# Patient Record
Sex: FEMALE | Race: BLACK OR AFRICAN AMERICAN | Employment: FULL TIME | URBAN - METROPOLITAN AREA
[De-identification: names, ages, dates, MRNs, and addresses within clinical notes are randomized per-mention and may not be internally consistent; named-entity substitution may affect disease eponyms.]

---

## 2024-11-13 ENCOUNTER — OFFICE VISIT (OUTPATIENT)
Age: 19
End: 2024-11-13
Payer: COMMERCIAL

## 2024-11-13 VITALS
HEART RATE: 80 BPM | RESPIRATION RATE: 16 BRPM | WEIGHT: 230 LBS | SYSTOLIC BLOOD PRESSURE: 117 MMHG | OXYGEN SATURATION: 95 % | HEIGHT: 65 IN | BODY MASS INDEX: 38.32 KG/M2 | TEMPERATURE: 98 F | DIASTOLIC BLOOD PRESSURE: 78 MMHG

## 2024-11-13 DIAGNOSIS — N93.9 ABNORMAL UTERINE BLEEDING (AUB): Primary | ICD-10-CM

## 2024-11-13 DIAGNOSIS — Z97.5 IUD (INTRAUTERINE DEVICE) IN PLACE: ICD-10-CM

## 2024-11-13 PROCEDURE — 99203 OFFICE O/P NEW LOW 30 MIN: CPT | Performed by: OBSTETRICS & GYNECOLOGY

## 2024-11-13 RX ORDER — LEVONORGESTREL 52 MG/1
1 INTRAUTERINE DEVICE INTRAUTERINE ONCE
COMMUNITY

## 2024-11-13 SDOH — ECONOMIC STABILITY: INCOME INSECURITY: HOW HARD IS IT FOR YOU TO PAY FOR THE VERY BASICS LIKE FOOD, HOUSING, MEDICAL CARE, AND HEATING?: SOMEWHAT HARD

## 2024-11-13 SDOH — ECONOMIC STABILITY: TRANSPORTATION INSECURITY
IN THE PAST 12 MONTHS, HAS LACK OF TRANSPORTATION KEPT YOU FROM MEETINGS, WORK, OR FROM GETTING THINGS NEEDED FOR DAILY LIVING?: NO

## 2024-11-13 SDOH — ECONOMIC STABILITY: FOOD INSECURITY: WITHIN THE PAST 12 MONTHS, THE FOOD YOU BOUGHT JUST DIDN'T LAST AND YOU DIDN'T HAVE MONEY TO GET MORE.: NEVER TRUE

## 2024-11-13 SDOH — ECONOMIC STABILITY: FOOD INSECURITY: WITHIN THE PAST 12 MONTHS, YOU WORRIED THAT YOUR FOOD WOULD RUN OUT BEFORE YOU GOT MONEY TO BUY MORE.: NEVER TRUE

## 2024-11-13 ASSESSMENT — PATIENT HEALTH QUESTIONNAIRE - PHQ9
SUM OF ALL RESPONSES TO PHQ9 QUESTIONS 1 & 2: 0
SUM OF ALL RESPONSES TO PHQ9 QUESTIONS 1 & 2: 0
SUM OF ALL RESPONSES TO PHQ QUESTIONS 1-9: 0
1. LITTLE INTEREST OR PLEASURE IN DOING THINGS: NOT AT ALL
1. LITTLE INTEREST OR PLEASURE IN DOING THINGS: NOT AT ALL
SUM OF ALL RESPONSES TO PHQ QUESTIONS 1-9: 0
2. FEELING DOWN, DEPRESSED OR HOPELESS: NOT AT ALL
SUM OF ALL RESPONSES TO PHQ QUESTIONS 1-9: 0
2. FEELING DOWN, DEPRESSED OR HOPELESS: NOT AT ALL
SUM OF ALL RESPONSES TO PHQ QUESTIONS 1-9: 0

## 2024-11-13 NOTE — PROGRESS NOTES
Problem Visit    Chief Complaint:     Chief Complaint   Patient presents with    New Patient       HPI:    Kassie Arias is a 19 y.o.  female with LMP of No LMP recorded. (Menstrual status: IUD).  who complains of abnormal uterine bleeding.  She developed this problem approximately 3 weeks ago.     Pt has a mirena IUD that was placed 2020. She does not have cyclic menses but does have occasional spotting. She started to have random bleeding the weekend of 10/25 that lasted for 3 days. Then she noticed random bleeding episodes ranging from 1-3 days since then. She is using 2 liners/day. No cramping. No pain with sex.     GYN US done today showed IUD in the proper place in the uterus.     Her current method of family planning is IUD.     Past Medical History:    Past Medical History:   Diagnosis Date    IUD (intrauterine device) in place         History reviewed. No pertinent surgical history.  Social History     Occupational History    Not on file   Tobacco Use    Smoking status: Never    Smokeless tobacco: Never   Vaping Use    Vaping status: Never Used   Substance and Sexual Activity    Alcohol use: Yes     Comment: occasionally    Drug use: Never    Sexual activity: Yes     Partners: Male     Birth control/protection: I.U.D.     Family History   Problem Relation Age of Onset    Hypertension Mother     Mental Illness Father        No Known Allergies  Prior to Admission medications    Medication Sig Start Date End Date Taking? Authorizing Provider   levonorgestrel (MIRENA, 52 MG,) IUD 52 mg 1 each by IntraUTERine route once   Yes Provider, Historical, MD        Review of Systems - History obtained from the patient    Constitutional: negative for weight loss, fever, night sweats  CV: negative for chest pain, palpitations, edema  Resp: negative for cough, shortness of breath, wheezing  GI: negative for change in bowel habits, abdominal pain, black or bloody stools  : negative for frequency, dysuria,

## 2024-11-13 NOTE — PROGRESS NOTES
Kassie Arias is a 19 y.o. female presents for a problem visit.    Chief Complaint   Patient presents with    New Patient     No LMP recorded. (Menstrual status: IUD).  Birth Control: IUD  Last Pap: n/a    The patient is reporting having: irregular bleeding that started early October.  Pt states she has one day per week where she has light bleeding.            1. Have you been to the ER, urgent care clinic, or hospitalized since your last visit? New patient    2. Have you seen or consulted any other health care providers outside of the [facility] since your last visit? New patient    Examination chaperoned by Myesha Morejon LPN.

## 2024-12-11 ENCOUNTER — PROCEDURE VISIT (OUTPATIENT)
Age: 19
End: 2024-12-11
Payer: COMMERCIAL

## 2024-12-11 VITALS
OXYGEN SATURATION: 98 % | DIASTOLIC BLOOD PRESSURE: 83 MMHG | BODY MASS INDEX: 37.65 KG/M2 | HEART RATE: 73 BPM | SYSTOLIC BLOOD PRESSURE: 126 MMHG | WEIGHT: 226 LBS | TEMPERATURE: 97.3 F | HEIGHT: 65 IN | RESPIRATION RATE: 16 BRPM

## 2024-12-11 DIAGNOSIS — Z30.432 ENCOUNTER FOR IUD REMOVAL: Primary | ICD-10-CM

## 2024-12-11 PROCEDURE — 58301 REMOVE INTRAUTERINE DEVICE: CPT | Performed by: OBSTETRICS & GYNECOLOGY

## 2024-12-11 NOTE — PROGRESS NOTES
Kassie Arias is a 19 y.o. female presents for     Chief Complaint   Patient presents with    Procedure     IUD removal       No LMP recorded. (Menstrual status: IUD).    Birth Control: IUD    Last Pap: n/a        1. Have you been to the ER, urgent care clinic, or hospitalized since your last visit? No    2. Have you seen or consulted any other health care providers outside of the [facility] since your last visit? No      Chart reviewed for the following:   Myesha SINGH LPN, have reviewed the History, Physical and updated the Allergic reactions for Kassie Arias     TIME OUT performed immediately prior to start of procedure:   Myesha SINGH LPN, have performed the following reviews on Kassie Arias prior to the start of the procedure:            * Patient was identified by name and date of birth   * Agreement on procedure being performed was verified  * Risks and Benefits explained to the patient  * Procedure site verified and marked as necessary  * Patient was positioned for comfort  * Consent was signed and verified     Time: 11:20 am    Date of procedure: 12/11/2024    Procedure performed by:  Hay Alvarez MD       Provider assisted by: Myesha Morejon LPN     Patient assisted by: friend    How tolerated by patient: tolerated the procedure well with no complications    Post Procedural Pain Scale: 0 - No Hurt    Comments: none    Examination chaperoned by Myesha Morejon LPN.

## 2024-12-11 NOTE — PROGRESS NOTES
IUD REMOVAL    Chief Complaint: IUD removal    Indications for Removal:  Kassie Arias is a 19 y.o.  female presents for Mirena  removal. Her current IUD was placed 5 years ago. She has had bleeding problems with the IUD.  She requests removal of the IUD because she has experienced abnormal uterine bleeding. The IUD removal procedure was discussed with the patient and she had no further questions. Consent was obtained.   Pt does not want another contraception option. She is considering pregnancy next year.     Procedure:   Chaperone present    After a brief TIME OUT, the patient was placed in a dorsal lithotomy position and speculum was placed into the vagina. The cervix was visualized and the IUD strings were visualized. The strings were grasped with uterine dressing forcep and the IUD was removed intact. The IUD was disposed of in medical waste. The speculum was removed and the patient was monitored for 10 minutes after the procedure. She reports mild cramping.     Post Procedure:   The patient was informed of the need for contraception after IUD removal. She was also reminded that spotting and cramping are common after procedure and OTC pain medications were discussed. Questions answered.     Follow Up:  As needed      Hay Alvarez MD

## 2025-04-01 DIAGNOSIS — O36.80X0 ENCOUNTER TO DETERMINE FETAL VIABILITY OF PREGNANCY, SINGLE OR UNSPECIFIED FETUS: Primary | ICD-10-CM

## 2025-04-23 ENCOUNTER — PATIENT MESSAGE (OUTPATIENT)
Age: 20
End: 2025-04-23

## 2025-04-23 ENCOUNTER — INITIAL PRENATAL (OUTPATIENT)
Age: 20
End: 2025-04-23

## 2025-04-23 VITALS
BODY MASS INDEX: 38.49 KG/M2 | WEIGHT: 231 LBS | TEMPERATURE: 98.7 F | DIASTOLIC BLOOD PRESSURE: 76 MMHG | OXYGEN SATURATION: 98 % | HEIGHT: 65 IN | HEART RATE: 94 BPM | RESPIRATION RATE: 20 BRPM | SYSTOLIC BLOOD PRESSURE: 113 MMHG

## 2025-04-23 DIAGNOSIS — Z34.90 PREGNANCY, UNSPECIFIED GESTATIONAL AGE: Primary | ICD-10-CM

## 2025-04-23 PROBLEM — N93.9 ABNORMAL UTERINE BLEEDING (AUB): Status: RESOLVED | Noted: 2024-11-13 | Resolved: 2025-04-23

## 2025-04-23 PROBLEM — O99.210 OBESITY IN PREGNANCY: Status: ACTIVE | Noted: 2025-04-23

## 2025-04-23 PROCEDURE — 0500F INITIAL PRENATAL CARE VISIT: CPT | Performed by: OBSTETRICS & GYNECOLOGY

## 2025-04-23 RX ORDER — ASPIRIN 81 MG/1
81 TABLET ORAL DAILY
Qty: 90 TABLET | Refills: 3 | Status: SHIPPED | OUTPATIENT
Start: 2025-04-23

## 2025-04-23 RX ORDER — ONDANSETRON 4 MG/1
4 TABLET, ORALLY DISINTEGRATING ORAL 3 TIMES DAILY PRN
Qty: 30 TABLET | Refills: 3 | Status: SHIPPED | OUTPATIENT
Start: 2025-04-23

## 2025-04-23 SDOH — ECONOMIC STABILITY: FOOD INSECURITY: WITHIN THE PAST 12 MONTHS, YOU WORRIED THAT YOUR FOOD WOULD RUN OUT BEFORE YOU GOT MONEY TO BUY MORE.: NEVER TRUE

## 2025-04-23 SDOH — ECONOMIC STABILITY: FOOD INSECURITY: WITHIN THE PAST 12 MONTHS, THE FOOD YOU BOUGHT JUST DIDN'T LAST AND YOU DIDN'T HAVE MONEY TO GET MORE.: NEVER TRUE

## 2025-04-23 ASSESSMENT — ENCOUNTER SYMPTOMS
VOMITING: 0
RESPIRATORY NEGATIVE: 1
NAUSEA: 0
DIARRHEA: 0
BACK PAIN: 0
EYES NEGATIVE: 1

## 2025-04-23 ASSESSMENT — PATIENT HEALTH QUESTIONNAIRE - PHQ9
1. LITTLE INTEREST OR PLEASURE IN DOING THINGS: NOT AT ALL
SUM OF ALL RESPONSES TO PHQ QUESTIONS 1-9: 0
SUM OF ALL RESPONSES TO PHQ QUESTIONS 1-9: 0
2. FEELING DOWN, DEPRESSED OR HOPELESS: NOT AT ALL
SUM OF ALL RESPONSES TO PHQ QUESTIONS 1-9: 0
SUM OF ALL RESPONSES TO PHQ QUESTIONS 1-9: 0

## 2025-04-23 NOTE — PROGRESS NOTES
Kassie Arias is a 19 y.o. female presents for a new pregnancy visit.    Chief Complaint   Patient presents with    Initial Prenatal Visit     Medical Hx: cardiac issues while she was in the womb    Would like to do both NIPT & carrier screening    Patient's last menstrual period was 2025.    Last Pap: n/a    LMP history:    Please see US data     Ultrasound data:  She had an ultrasound done by the ultrasound tech today which revealed a viable burrows pregnancy with a gestational age of 8 weeks and 4 days giving an EDC of 2025.    Pregnancy symptoms:    Since her LMP she has experienced urinary frequency, breast tenderness, and nausea.   She has not been vomiting over the last few weeks.  Associated signs and symptoms which she denies: dysuria, discharge, vaginal bleeding.    She states she has gained weight:  Approximately 5 pounds over the last few weeks.    Relevant past pregnancy history:   She has the following pregnancy history: this is her first pregnancy   She has no history of  delivery.    Relevant past medical history:(relevant to this pregnancy): noncontributory.      Her occupation is: [employer]    1. Have you been to the ER, urgent care clinic, or hospitalized since your last visit? No    2. Have you seen or consulted any other health care providers outside of the [facility] since your last visit? No    Examination chaperoned by Ellie Harvey LPN.

## 2025-04-23 NOTE — PROGRESS NOTES
New OB Visit      Chief Complaint:   Chief Complaint   Patient presents with    Initial Prenatal Visit          HPI:    Kassie Arias is a 19 y.o.  Black /  female who presents for her new OB visit today.    Her ultrasound today showed a burrows pregnancy at 8w4d  with an of VALERIE 25 by ultrasound.  .      Patient's past medical history is significant for Obesity.       Since she found out she was pregnant, the patient has experienced the following problems: nausea.     Social History:     Pt does not have cats at home.     Pt has chicken pox or the chicken pox vaccine.     Patient denies travelling outside of the [place] in the last 6 months.     Genetics:    Patient and partner's genetic history reviewed, and they deny a personal history or family history of Sickle Cell Disease/Trait, Down Syndrome, Spina Bifida/Neural tube defects, Fragile X Syndrome, Muscular Dystrophy or Cystic Fibrosis (or carrier state). .    Genetic testing options in pregnancy discussed, including NIPT with AFP, Cystic Fibrosis carrier testing, Hemoglobin electrophoresis, referral to [facility] for CVS or amniocentesis, and no genetic testing and patient opts for NIPT/AFP testing and Hemoglobin electrophoresis testing.     Her current medications include   Current Outpatient Medications   Medication Sig Dispense Refill    aspirin 81 MG EC tablet Take 1 tablet by mouth daily 90 tablet 3    ondansetron (ZOFRAN-ODT) 4 MG disintegrating tablet Take 1 tablet by mouth 3 times daily as needed for Nausea or Vomiting 30 tablet 3    Prenatal MV & Min w/FA-DHA (PRENATAL ADULT GUMMY/DHA/FA) 0.4-25 MG CHEW Take 1 tablet by mouth daily 90 tablet 3     No current facility-administered medications for this visit.    .      Past Medical History:    Past Medical History:   Diagnosis Date    Encounter for IUD removal 2024    Mirena. Inserted 2020    Obesity      History reviewed. No pertinent surgical history.    Current Outpatient

## 2025-04-25 ENCOUNTER — RESULTS FOLLOW-UP (OUTPATIENT)
Age: 20
End: 2025-04-25

## 2025-04-25 LAB
BACTERIA SPEC CULT: NORMAL
SERVICE CMNT-IMP: NORMAL

## 2025-04-27 LAB
A VAGINAE DNA VAG QL NAA+PROBE: ABNORMAL SCORE
BVAB2 DNA VAG QL NAA+PROBE: ABNORMAL SCORE
C ALBICANS DNA VAG QL NAA+PROBE: POSITIVE
C GLABRATA DNA VAG QL NAA+PROBE: NEGATIVE
MEGA1 DNA VAG QL NAA+PROBE: ABNORMAL SCORE
SPECIMEN SOURCE: ABNORMAL

## 2025-04-28 LAB
A VAGINAE DNA VAG QL NAA+PROBE: ABNORMAL SCORE
BVAB2 DNA VAG QL NAA+PROBE: ABNORMAL SCORE
C ALBICANS DNA VAG QL NAA+PROBE: POSITIVE
C GLABRATA DNA VAG QL NAA+PROBE: NEGATIVE
MEGA1 DNA VAG QL NAA+PROBE: ABNORMAL SCORE
SPECIMEN SOURCE: ABNORMAL
T VAGINALIS RRNA SPEC QL NAA+PROBE: POSITIVE

## 2025-04-28 RX ORDER — METRONIDAZOLE 500 MG/1
500 TABLET ORAL 2 TIMES DAILY
Qty: 14 TABLET | Refills: 0 | Status: SHIPPED | OUTPATIENT
Start: 2025-04-28 | End: 2025-05-05

## 2025-04-28 RX ORDER — TERCONAZOLE 4 MG/G
1 CREAM VAGINAL NIGHTLY
Qty: 45 G | Refills: 0 | Status: SHIPPED | OUTPATIENT
Start: 2025-04-28 | End: 2025-05-05

## 2025-04-29 ENCOUNTER — APPOINTMENT (OUTPATIENT)
Facility: HOSPITAL | Age: 20
End: 2025-04-29
Payer: COMMERCIAL

## 2025-04-29 ENCOUNTER — HOSPITAL ENCOUNTER (EMERGENCY)
Facility: HOSPITAL | Age: 20
Discharge: HOME | End: 2025-04-29
Attending: EMERGENCY MEDICINE
Payer: COMMERCIAL

## 2025-04-29 VITALS
HEART RATE: 67 BPM | BODY MASS INDEX: 38.52 KG/M2 | OXYGEN SATURATION: 97 % | WEIGHT: 231.48 LBS | TEMPERATURE: 98.8 F | SYSTOLIC BLOOD PRESSURE: 131 MMHG | DIASTOLIC BLOOD PRESSURE: 75 MMHG | RESPIRATION RATE: 16 BRPM

## 2025-04-29 DIAGNOSIS — O20.0 THREATENED MISCARRIAGE: Primary | ICD-10-CM

## 2025-04-29 LAB
ABO + RH BLD: NORMAL
ALBUMIN SERPL-MCNC: 3.4 G/DL (ref 3.5–5)
ALBUMIN/GLOB SERPL: 0.9 (ref 1.1–2.2)
ALP SERPL-CCNC: 53 U/L (ref 45–117)
ALT SERPL-CCNC: 14 U/L (ref 12–78)
ANION GAP SERPL CALC-SCNC: 8 MMOL/L (ref 2–12)
APPEARANCE UR: ABNORMAL
AST SERPL-CCNC: 11 U/L (ref 15–37)
BACTERIA URNS QL MICRO: ABNORMAL /HPF
BASOPHILS # BLD: 0.02 K/UL (ref 0–0.1)
BASOPHILS NFR BLD: 0.3 % (ref 0–1)
BILIRUB SERPL-MCNC: 0.5 MG/DL (ref 0.2–1)
BILIRUB UR QL: NEGATIVE
BLOOD BANK CMNT PATIENT-IMP: NORMAL
BUN SERPL-MCNC: 8 MG/DL (ref 6–20)
BUN/CREAT SERPL: 10 (ref 12–20)
CALCIUM SERPL-MCNC: 9.3 MG/DL (ref 8.5–10.1)
CHLORIDE SERPL-SCNC: 105 MMOL/L (ref 97–108)
CO2 SERPL-SCNC: 23 MMOL/L (ref 21–32)
COLOR UR: ABNORMAL
CREAT SERPL-MCNC: 0.8 MG/DL (ref 0.55–1.02)
CRL: 2.73 CM
CRL: 2.77 CM
CRL: 2.8 CM
DIFFERENTIAL METHOD BLD: NORMAL
EOSINOPHIL # BLD: 0.05 K/UL (ref 0–0.4)
EOSINOPHIL NFR BLD: 0.6 % (ref 0–7)
EPITH CASTS URNS QL MICRO: ABNORMAL /LPF
ERYTHROCYTE [DISTWIDTH] IN BLOOD BY AUTOMATED COUNT: 12.6 % (ref 11.5–14.5)
GLOBULIN SER CALC-MCNC: 3.9 G/DL (ref 2–4)
GLUCOSE SERPL-MCNC: 97 MG/DL (ref 65–100)
GLUCOSE UR STRIP.AUTO-MCNC: NEGATIVE MG/DL
HCG SERPL-ACNC: ABNORMAL MIU/ML (ref 0–6)
HCT VFR BLD AUTO: 40.2 % (ref 35–47)
HGB BLD-MCNC: 13.8 G/DL (ref 11.5–16)
HGB UR QL STRIP: ABNORMAL
HYALINE CASTS URNS QL MICRO: ABNORMAL /LPF (ref 0–2)
IMM GRANULOCYTES # BLD AUTO: 0.04 K/UL (ref 0–0.04)
IMM GRANULOCYTES NFR BLD AUTO: 0.5 % (ref 0–0.5)
KETONES UR QL STRIP.AUTO: ABNORMAL MG/DL
LEUKOCYTE ESTERASE UR QL STRIP.AUTO: ABNORMAL
LYMPHOCYTES # BLD: 2.58 K/UL (ref 0.8–3.5)
LYMPHOCYTES NFR BLD: 32.7 % (ref 12–49)
MCH RBC QN AUTO: 29.2 PG (ref 26–34)
MCHC RBC AUTO-ENTMCNC: 34.3 G/DL (ref 30–36.5)
MCV RBC AUTO: 85.2 FL (ref 80–99)
MONOCYTES # BLD: 0.51 K/UL (ref 0–1)
MONOCYTES NFR BLD: 6.5 % (ref 5–13)
NEUTS SEG # BLD: 4.7 K/UL (ref 1.8–8)
NEUTS SEG NFR BLD: 59.4 % (ref 32–75)
NITRITE UR QL STRIP.AUTO: NEGATIVE
NRBC # BLD: 0 K/UL (ref 0–0.01)
NRBC BLD-RTO: 0 PER 100 WBC
PH UR STRIP: 6.5 (ref 5–8)
PLATELET # BLD AUTO: 236 K/UL (ref 150–400)
PMV BLD AUTO: 11.3 FL (ref 8.9–12.9)
POTASSIUM SERPL-SCNC: 4 MMOL/L (ref 3.5–5.1)
PROT SERPL-MCNC: 7.3 G/DL (ref 6.4–8.2)
PROT UR STRIP-MCNC: 30 MG/DL
RBC # BLD AUTO: 4.72 M/UL (ref 3.8–5.2)
RBC #/AREA URNS HPF: >100 /HPF (ref 0–5)
SAC DIAMETER: 4.62 CM
SODIUM SERPL-SCNC: 136 MMOL/L (ref 136–145)
SP GR UR REFRACTOMETRY: 1.03
URINE CULTURE IF INDICATED: ABNORMAL
UROBILINOGEN UR QL STRIP.AUTO: 1 EU/DL (ref 0.2–1)
WBC # BLD AUTO: 7.9 K/UL (ref 3.6–11)
WBC URNS QL MICRO: ABNORMAL /HPF (ref 0–4)

## 2025-04-29 PROCEDURE — 87086 URINE CULTURE/COLONY COUNT: CPT

## 2025-04-29 PROCEDURE — 76817 TRANSVAGINAL US OBSTETRIC: CPT

## 2025-04-29 PROCEDURE — 84702 CHORIONIC GONADOTROPIN TEST: CPT

## 2025-04-29 PROCEDURE — 80053 COMPREHEN METABOLIC PANEL: CPT

## 2025-04-29 PROCEDURE — 86900 BLOOD TYPING SEROLOGIC ABO: CPT

## 2025-04-29 PROCEDURE — 86901 BLOOD TYPING SEROLOGIC RH(D): CPT

## 2025-04-29 PROCEDURE — 85025 COMPLETE CBC W/AUTO DIFF WBC: CPT

## 2025-04-29 PROCEDURE — 36415 COLL VENOUS BLD VENIPUNCTURE: CPT

## 2025-04-29 PROCEDURE — 81001 URINALYSIS AUTO W/SCOPE: CPT

## 2025-04-29 PROCEDURE — 99284 EMERGENCY DEPT VISIT MOD MDM: CPT

## 2025-04-29 ASSESSMENT — PAIN SCALES - GENERAL: PAINLEVEL_OUTOF10: 0

## 2025-04-29 NOTE — ED NOTES
Pt discharged by Julian NGUYỄN. Discharge instructions discussed and pt given opportunity to ask questions. Pt ambulatory out of ED

## 2025-04-29 NOTE — ED PROVIDER NOTES
131/75   Pulse: 85 67    Resp: 18 16    Temp: 98.8 °F (37.1 °C)     SpO2: 99% 100% 97%   Weight: 105 kg (231 lb 7.7 oz)              Patient was given the following medications:  Medications - No data to display    CONSULTS: (Who and What was discussed)  None      CLINICAL MANAGEMENT TOOLS:  Not Applicable    Chronic Conditions:   Past Medical History:   Diagnosis Date    Encounter for IUD removal 2024    Mirena. Inserted 2020    Obesity        Social Determinants affecting Dx or Tx: None    Records Reviewed (source and summary of external notes): Old Medical Records, Nursing Notes, Prior ED/Hospital Visits    CC/HPI Summary, DDx, ED Course, and Reassessment: Patient is a 19-year-old female presenting with bleeding in the first trimester.  Considered ectopic pregnancy, completed miscarriage, incomplete AB .  Ultrasound showed a normal IUP, no active bleeding throughout her stay, ABO positive therefore no risk of isoimmunization.  Patient feeling better, threatened AB diagnosis, follow-up with OB as outpatient.    Heart Score:   NIHSS:         Disposition Considerations (Tests not done, Shared Decision Making, Pt Expectation of Test or Tx.):      FINAL IMPRESSION     1. Threatened miscarriage          DISPOSITION/PLAN   DISPOSITION Decision To Discharge 2025 09:32:55 AM   DISPOSITION CONDITION Stable           Discharge Note: The patient is stable for discharge home. The signs, symptoms, diagnosis, and discharge instructions have been discussed, understanding conveyed, and agreed upon. The patient is to follow up as recommended or return to ER should their symptoms worsen.      PATIENT REFERRED TO:  Hay Alvarez MD  [address] [place] [id number] [phone]    In 1 week  follow-up appointment    [facility] Emergency Department  [address] [facility] [address] [phone]  In 2 days  If symptoms worsen         DISCHARGE MEDICATIONS:     Medication

## 2025-04-29 NOTE — PROGRESS NOTES
Chief Complaint   Patient presents with    Follow-up       Patient is following up from hospital visit on 4/29/2025. Patient presented to the ER with vaginal bleeding and passed a large clot. Relays that they did blood work and an US and baby looked good.    Denies anymore bleeding     Patient wants to discuss if this is normal and if she should expect this to happen again.     1. Have you been to the ER, urgent care clinic since your last visit?  Hospitalized since your last visit? yes    2. Have you seen or consulted any other health care providers outside of the [facility] since your last visit?  Include any pap smears or colon screening. No

## 2025-04-29 NOTE — ED NOTES
Full verbal report given to Katerina MIRELES by Bekah MIRELES. SBAR reviewed and questions answered. Transfer of care at this time. Pt AJITH at US att.

## 2025-04-29 NOTE — DISCHARGE INSTRUCTIONS
Thank you for choosing our Emergency Department for your care.  It is our privilege to care for you in your time of need.  In the next several days, you may receive a survey via email or mailed to your home about your experience with our team.  We would greatly appreciate you taking a few minutes to complete the survey, as we use this information to learn what we have done well and what we could be doing better. Thank you for trusting us with your care!    Below you will find a list of your tests from today's visit.   Labs and Radiology Studies  Recent Results (from the past 12 hours)   CBC with Auto Differential    Collection Time: 04/29/25  6:45 AM   Result Value Ref Range    WBC 7.9 3.6 - 11.0 K/uL    RBC 4.72 3.80 - 5.20 M/uL    Hemoglobin 13.8 11.5 - 16.0 g/dL    Hematocrit 40.2 35.0 - 47.0 %    MCV 85.2 80.0 - 99.0 FL    MCH 29.2 26.0 - 34.0 PG    MCHC 34.3 30.0 - 36.5 g/dL    RDW 12.6 11.5 - 14.5 %    Platelets 236 150 - 400 K/uL    MPV 11.3 8.9 - 12.9 FL    Nucleated RBCs 0.0 0  WBC    nRBC 0.00 0.00 - 0.01 K/uL    Neutrophils % 59.4 32.0 - 75.0 %    Lymphocytes % 32.7 12.0 - 49.0 %    Monocytes % 6.5 5.0 - 13.0 %    Eosinophils % 0.6 0.0 - 7.0 %    Basophils % 0.3 0.0 - 1.0 %    Immature Granulocytes % 0.5 0.0 - 0.5 %    Neutrophils Absolute 4.70 1.80 - 8.00 K/UL    Lymphocytes Absolute 2.58 0.80 - 3.50 K/UL    Monocytes Absolute 0.51 0.00 - 1.00 K/UL    Eosinophils Absolute 0.05 0.00 - 0.40 K/UL    Basophils Absolute 0.02 0.00 - 0.10 K/UL    Immature Granulocytes Absolute 0.04 0.00 - 0.04 K/UL    Differential Type AUTOMATED     Comprehensive Metabolic Panel    Collection Time: 04/29/25  6:45 AM   Result Value Ref Range    Sodium 136 136 - 145 mmol/L    Potassium 4.0 3.5 - 5.1 mmol/L    Chloride 105 97 - 108 mmol/L    CO2 23 21 - 32 mmol/L    Anion Gap 8 2 - 12 mmol/L    Glucose 97 65 - 100 mg/dL    BUN 8 6 - 20 MG/DL    Creatinine 0.80 0.55 - 1.02 MG/DL    BUN/Creatinine Ratio 10 (L) 12 - 20

## 2025-04-30 ENCOUNTER — ROUTINE PRENATAL (OUTPATIENT)
Age: 20
End: 2025-04-30
Payer: COMMERCIAL

## 2025-04-30 VITALS
HEIGHT: 65 IN | TEMPERATURE: 98.7 F | SYSTOLIC BLOOD PRESSURE: 108 MMHG | OXYGEN SATURATION: 98 % | BODY MASS INDEX: 39.15 KG/M2 | HEART RATE: 82 BPM | DIASTOLIC BLOOD PRESSURE: 70 MMHG | RESPIRATION RATE: 20 BRPM | WEIGHT: 235 LBS

## 2025-04-30 DIAGNOSIS — O20.9 FIRST TRIMESTER BLEEDING: Primary | ICD-10-CM

## 2025-04-30 LAB
BACTERIA SPEC CULT: NORMAL
SERVICE CMNT-IMP: NORMAL

## 2025-04-30 PROCEDURE — 99213 OFFICE O/P EST LOW 20 MIN: CPT | Performed by: OBSTETRICS & GYNECOLOGY

## 2025-04-30 NOTE — PROGRESS NOTES
ER Follow Up Visit    Chief Complaint:     Chief Complaint   Patient presents with    Follow-up         HPI:    Ksasie Arias is a 19 y.o.  female with LMP of Patient's last menstrual period was 2025.  who presents for ER follow up. The patient was seen at [facility] on 25 for first trimester bleeding. She notes that she had the following testing done Blood work labs and Ultrasound and was diagnosed with threatened AB.  Her treatment included nothing. Since discharge from the ER, her problem has improved.  Pt woke up yesterday and had blood on her legs and passed a clot when she went to the bathroom. She went to [facility] ER for evaluation. US showed a 9wk IUP. Pt had not started taking treatment for trich/bv or yeast yet. No recent sex.     RH positive.     Bleeding has stopped today.      Past Medical History:    Past Medical History:   Diagnosis Date    Encounter for IUD removal 2024    Mirena. Inserted 2020    Obesity         No past surgical history on file.  Social History     Occupational History    Not on file   Tobacco Use    Smoking status: Never    Smokeless tobacco: Never   Vaping Use    Vaping status: Never Used   Substance and Sexual Activity    Alcohol use: Yes     Comment: occasionally    Drug use: Never    Sexual activity: Yes     Partners: Male     Birth control/protection: None     Family History   Problem Relation Age of Onset    Hypertension Mother     Mental Illness Father        No Known Allergies  Prior to Admission medications    Medication Sig Start Date End Date Taking? Authorizing Provider   metroNIDAZOLE (FLAGYL) 500 MG tablet Take 1 tablet by mouth 2 times daily for 7 days 25 Yes Hay Alvarez MD   terconazole (TERAZOL 7) 0.4 % vaginal cream Place 1 applicator vaginally nightly for 7 days Place vaginally nightly. 25 Yes Hay Alvarez MD   ondansetron (ZOFRAN-ODT) 4 MG disintegrating tablet Take 1 tablet by mouth

## 2025-05-21 ENCOUNTER — ROUTINE PRENATAL (OUTPATIENT)
Age: 20
End: 2025-05-21

## 2025-05-21 VITALS
RESPIRATION RATE: 16 BRPM | HEIGHT: 65 IN | SYSTOLIC BLOOD PRESSURE: 111 MMHG | HEART RATE: 105 BPM | TEMPERATURE: 98.1 F | BODY MASS INDEX: 39.15 KG/M2 | DIASTOLIC BLOOD PRESSURE: 74 MMHG | WEIGHT: 235 LBS | OXYGEN SATURATION: 96 %

## 2025-05-21 DIAGNOSIS — Z13.79 ENCOUNTER FOR GENETIC SCREENING FOR BIRTH DEFECT: ICD-10-CM

## 2025-05-21 DIAGNOSIS — Z34.90 PREGNANCY, UNSPECIFIED GESTATIONAL AGE: ICD-10-CM

## 2025-05-21 DIAGNOSIS — O99.210 OBESITY IN PREGNANCY: ICD-10-CM

## 2025-05-21 DIAGNOSIS — A59.09 TRICHOMONAL CERVICITIS: ICD-10-CM

## 2025-05-21 DIAGNOSIS — Z3A.12 12 WEEKS GESTATION OF PREGNANCY: Primary | ICD-10-CM

## 2025-05-21 DIAGNOSIS — Z3A.12 12 WEEKS GESTATION OF PREGNANCY: ICD-10-CM

## 2025-05-21 LAB
ABO + RH BLD: NORMAL
BLOOD BANK CMNT PATIENT-IMP: NORMAL
BLOOD GROUP ANTIBODIES SERPL: NORMAL
SPECIMEN EXP DATE BLD: NORMAL

## 2025-05-21 PROCEDURE — 0502F SUBSEQUENT PRENATAL CARE: CPT | Performed by: OBSTETRICS & GYNECOLOGY

## 2025-05-21 NOTE — PROGRESS NOTES
Chief Complaint   Patient presents with    Routine Prenatal Visit       Doing well since last visit. Does relay and increase in n/v & is not taking zofran.     Denies lof, vb     JOANNE today    1. Have you been to the ER, urgent care clinic since your last visit?  Hospitalized since your last visit?No    2. Have you seen or consulted any other health care providers outside of the [facility] since your last visit?  Include any pap smears or colon screening. No

## 2025-05-21 NOTE — PROGRESS NOTES
Prenatal Visit    Chief Complaint:   Chief Complaint   Patient presents with    Routine Prenatal Visit       HPI:    Kassie Arias is a 19 y.o.  Black /   female  at 12w4d weeks. Patient reports Negative fetal movements. Patient reports no problems with contractions, vaginal bleeding or loss of fluid.     Patient able to void today: No    Patient reports she is having random nausea associated with certain foods but is avoiding using zofran due to constipation. She completed her antibiotics for trich and will do test of cure today. She is doing her prenatal labs and genetics testing today.     OB History    Para Term  AB Living   1 0 0 0 0 0   SAB IAB Ectopic Molar Multiple Live Births   0 0 0 0 0 0      # Outcome Date GA Lbr Jagdeep/2nd Weight Sex Type Anes PTL Lv   1 Current                Mother's Prenatal Vitals  BP: 111/74  Weight - Scale: 106.6 kg (235 lb)  Pulse: (!) 105  Prenatal Fetal Information  Fetal HR: 155  Movement: Absent  Dil/Eff/Sta  Dilation (cm): Closed    Patient Active Problem List   Diagnosis    Pregnancy    Obesity in pregnancy        Assessment/Plan:  Kassie Arias is a 19 y.o.  Black /  female at 12w4d weeks.   Pregnancy complicated by obesity, trich.   Currently doing well.    Pregnancy:   Prenatal labs reviewed.   Discussed expectations at current gestation. Tylenol/heat/ice rest for discomforts noted in pregnancy  Labs ordered today:  prenatal labs, NIPT and nuswab  Anatomy Ultrasound  at 20 weeks  The patient will return to the office for her next visit in 4 week(s).   Trich cervicitis  Nuswab today  Obesity  ASA  Nausea  Given association with some foods, will start on GERD treatment.       Hay Alvarez MD

## 2025-05-22 ENCOUNTER — RESULTS FOLLOW-UP (OUTPATIENT)
Age: 20
End: 2025-05-22

## 2025-05-22 DIAGNOSIS — Z3A.12 12 WEEKS GESTATION OF PREGNANCY: Primary | ICD-10-CM

## 2025-05-22 LAB
ERYTHROCYTE [DISTWIDTH] IN BLOOD BY AUTOMATED COUNT: 12.9 % (ref 11.5–14.5)
FERRITIN SERPL-MCNC: 27 NG/ML (ref 26–388)
HBV SURFACE AG SER QL: 0.1 INDEX
HBV SURFACE AG SER QL: NEGATIVE
HCT VFR BLD AUTO: 34.7 % (ref 35–47)
HCV AB SER IA-ACNC: 0.03 INDEX
HCV AB SERPL QL IA: NONREACTIVE
HGB BLD-MCNC: 11.9 G/DL (ref 11.5–16)
HIV 1+2 AB+HIV1 P24 AG SERPL QL IA: NONREACTIVE
HIV 1/2 RESULT COMMENT: NORMAL
MCH RBC QN AUTO: 29 PG (ref 26–34)
MCHC RBC AUTO-ENTMCNC: 34.3 G/DL (ref 30–36.5)
MCV RBC AUTO: 84.4 FL (ref 80–99)
NRBC # BLD: 0 K/UL (ref 0–0.01)
NRBC BLD-RTO: 0 PER 100 WBC
PLATELET # BLD AUTO: 230 K/UL (ref 150–400)
PMV BLD AUTO: 12.8 FL (ref 8.9–12.9)
RBC # BLD AUTO: 4.11 M/UL (ref 3.8–5.2)
RUBV IGG SERPL IA-ACNC: NORMAL IU/ML
T PALLIDUM AB SER QL IA: NON REACTIVE
TSH SERPL DL<=0.05 MIU/L-ACNC: 0.7 UIU/ML (ref 0.36–3.74)
VZV IGG SER IA-ACNC: REACTIVE
WBC # BLD AUTO: 8.9 K/UL (ref 3.6–11)

## 2025-05-25 LAB
A VAGINAE DNA VAG QL NAA+PROBE: NORMAL SCORE
BVAB2 DNA VAG QL NAA+PROBE: NORMAL SCORE
C ALBICANS DNA VAG QL NAA+PROBE: NEGATIVE
C GLABRATA DNA VAG QL NAA+PROBE: NEGATIVE
C TRACH DNA SPEC QL NAA+PROBE: NEGATIVE
MEGA1 DNA VAG QL NAA+PROBE: NORMAL SCORE
N GONORRHOEA DNA VAG QL NAA+PROBE: NEGATIVE
T VAGINALIS DNA VAG QL NAA+PROBE: NEGATIVE

## 2025-05-27 LAB
HGB A MFR BLD: 97.2 % (ref 96.4–98.8)
HGB A2 MFR BLD COLUMN CHROM: 2.8 % (ref 1.8–3.2)
HGB F MFR BLD: 0 % (ref 0–2)
HGB FRACT BLD-IMP: NORMAL
HGB S MFR BLD: 0 %
Lab: NORMAL
NTRA 22Q11.2 DELETION SYNDROME POPULATION-BASED RISK TEXT: NORMAL
NTRA 22Q11.2 DELETION SYNDROME RESULT TEXT: NORMAL
NTRA 22Q11.2 DELETION SYNDROME RISK SCORE TEXT: NORMAL
NTRA FETAL FRACTION: NORMAL
NTRA GENDER OF FETUS: NORMAL
NTRA MONOSOMY X AGE-BASED RISK TEXT: NORMAL
NTRA MONOSOMY X RESULT TEXT: NORMAL
NTRA MONOSOMY X RISK SCORE TEXT: NORMAL
NTRA TRIPLOIDY RESULT TEXT: NORMAL
NTRA TRISOMY 13 AGE-BASED RISK TEXT: NORMAL
NTRA TRISOMY 13 RESULT TEXT: NORMAL
NTRA TRISOMY 13 RISK SCORE TEXT: NORMAL
NTRA TRISOMY 18 AGE-BASED RISK TEXT: NORMAL
NTRA TRISOMY 18 RESULT TEXT: NORMAL
NTRA TRISOMY 18 RISK SCORE TEXT: NORMAL
NTRA TRISOMY 21 AGE-BASED RISK TEXT: NORMAL
NTRA TRISOMY 21 RESULT TEXT: NORMAL
NTRA TRISOMY 21 RISK SCORE TEXT: NORMAL

## 2025-05-29 LAB
Lab: NEGATIVE
Lab: NORMAL
NTRA CYSTIC FIBROSIS: NEGATIVE
NTRA DUCHENNE/BECKER MUSCULAR DYSTROPHY: NEGATIVE
NTRA FRAGILE X SYNDROME: NEGATIVE
NTRA SPINAL MUSCULAR ATROPHY: NEGATIVE

## 2025-06-18 ENCOUNTER — ROUTINE PRENATAL (OUTPATIENT)
Age: 20
End: 2025-06-18

## 2025-06-18 VITALS
SYSTOLIC BLOOD PRESSURE: 114 MMHG | OXYGEN SATURATION: 98 % | TEMPERATURE: 97.8 F | BODY MASS INDEX: 39.65 KG/M2 | DIASTOLIC BLOOD PRESSURE: 75 MMHG | RESPIRATION RATE: 16 BRPM | HEART RATE: 110 BPM | HEIGHT: 65 IN | WEIGHT: 238 LBS

## 2025-06-18 DIAGNOSIS — Z13.71 SCREENING FOR GENETIC DISEASE CARRIER STATUS: ICD-10-CM

## 2025-06-18 DIAGNOSIS — Z34.90 PREGNANCY, UNSPECIFIED GESTATIONAL AGE: ICD-10-CM

## 2025-06-18 DIAGNOSIS — O99.210 OBESITY IN PREGNANCY: ICD-10-CM

## 2025-06-18 DIAGNOSIS — Z34.90 PREGNANCY, UNSPECIFIED GESTATIONAL AGE: Primary | ICD-10-CM

## 2025-06-18 PROCEDURE — 0502F SUBSEQUENT PRENATAL CARE: CPT | Performed by: OBSTETRICS & GYNECOLOGY

## 2025-06-18 NOTE — PROGRESS NOTES
Prenatal Visit    Chief Complaint:   Chief Complaint   Patient presents with    Routine Prenatal Visit       HPI:    Kassie Arias is a 20 y.o.  Black /   female  at 16w4d weeks. Patient reports Positive fetal movements. Patient reports no problems with contractions, vaginal bleeding or loss of fluid.     Patient able to void today: Yes    Patient reports no issues. AFP today.     OB History    Para Term  AB Living   1 0 0 0 0 0   SAB IAB Ectopic Molar Multiple Live Births   0 0 0 0 0 0      # Outcome Date GA Lbr Jagdeep/2nd Weight Sex Type Anes PTL Lv   1 Current                Mother's Prenatal Vitals  BP: 114/75  Weight - Scale: 108 kg (238 lb)  Pulse: (!) 110  Alb/Glu  Albumin: Negative  Glucose: Negative  Prenatal Fetal Information  Fetal HR: 150  Movement: Present       Patient Active Problem List   Diagnosis    Pregnancy    Obesity in pregnancy        Assessment/Plan:  Kassie Arias is a 20 y.o.  Black /  female at 16w4d weeks.   Pregnancy complicated by obesity.   Currently doing well.    Pregnancy:   Prenatal labs reviewed.   Discussed expectations at current gestation. Tylenol/heat/ice rest for discomforts noted in pregnancy  Labs ordered today: AFP  Anatomy Ultrasound  at 20 weeks with MFM  Referral placed due to obesity  The patient will return to the office for her next visit in 4 week(s).   Obesity  Continue LUIS Alvarez MD

## 2025-06-18 NOTE — PROGRESS NOTES
Chief Complaint   Patient presents with    Routine Prenatal Visit       Doing well since last visit    Active FM  Denies lof, vb & contractions    AFP testing    1. Have you been to the ER, urgent care clinic since your last visit?  Hospitalized since your last visit?No    2. Have you seen or consulted any other health care providers outside of the [facility] since your last visit?  Include any pap smears or colon screening. No

## 2025-06-21 LAB
AFP INTERP SERPL-IMP: NORMAL
AFP MOM SERPL: 1.84
AFP SERPL-MCNC: 50.8 NG/ML
AGE AT DELIVERY: 21.4 YR
AGE OF EGG DONOR: NORMAL
AGE OF EGG DONOR: NORMAL
COMMENT: NORMAL
DONOR EGG?: 4
DONOR EGG?: NO
FAMILY HISTORY NTD: NORMAL
FHX NTD (Y OR N): NORMAL
GA METHOD: NORMAL
GA: 16.7 WEEKS
IDDM PATIENT QL: NO
INSULIN DEP. DIABETIC: NORMAL
Lab: 238
Lab: NORMAL
Lab: NORMAL
MAT SCN FOR FETAL ABNORMALITIES SERPL: NORMAL
MULTIPLE PREGNANCY: NO
NEURAL TUBE DEFECT RISK FETUS: 1162
NUMBER OF FETUSES: NO
OTHER INDICATIONS: NO
OTHER INDICATIONS: NORMAL
PREVIOUSLY ELEVATED AFP (Y OR N): 16
PREVIOUSLY ELEVATED AFP (Y OR N): NO
PRIOR 1ST TRIM TESTING ?: NO
PRIOR 2ND TRIM TESTING ?: NO
PRIOR DS/NTD SCREEN CURRENT PREGNANCY?: NO
PRIOR DS/NTD SCREEN CURRENT PREGNANCY?: NORMAL
PRIOR FIRST TRIMESTER TESTING (Y OR N ): NORMAL
PRIOR PREGNANCY WITH DOWN SYNDROME (Y OR N): 1
PRIOR PREGNANCY WITH DOWN SYNDROME (Y OR N): NO
TYPE OF EGG DONOR: NORMAL
TYPE OF EGG DONOR: NORMAL

## 2025-06-23 ENCOUNTER — RESULTS FOLLOW-UP (OUTPATIENT)
Age: 20
End: 2025-06-23

## 2025-07-02 ENCOUNTER — TELEPHONE (OUTPATIENT)
Age: 20
End: 2025-07-02

## 2025-07-02 DIAGNOSIS — O99.210 OBESITY IN PREGNANCY: ICD-10-CM

## 2025-07-02 DIAGNOSIS — Z34.90 PREGNANCY, UNSPECIFIED GESTATIONAL AGE: Primary | ICD-10-CM

## 2025-07-02 NOTE — TELEPHONE ENCOUNTER
Patient is aware that referral was placed. She relays that she already has an appointment with [facility].

## 2025-07-03 ENCOUNTER — TELEPHONE (OUTPATIENT)
Age: 20
End: 2025-07-03

## 2025-07-14 DIAGNOSIS — Z34.90 PREGNANCY, UNSPECIFIED GESTATIONAL AGE: Primary | ICD-10-CM

## 2025-07-15 ENCOUNTER — ROUTINE PRENATAL (OUTPATIENT)
Age: 20
End: 2025-07-15

## 2025-07-15 VITALS — HEART RATE: 111 BPM | SYSTOLIC BLOOD PRESSURE: 137 MMHG | DIASTOLIC BLOOD PRESSURE: 82 MMHG

## 2025-07-15 DIAGNOSIS — Z34.90 PREGNANCY, UNSPECIFIED GESTATIONAL AGE: ICD-10-CM

## 2025-07-16 NOTE — PROGRESS NOTES
Patient was seen 7/16/2025      Please look under media to view full consult and ultrasound report in [employer].         Evi Bradford MD   Maternal Fetal Medicine

## 2025-07-16 NOTE — PROCEDURES
PATIENT: KRYSTLE CAMPBELL   -  : 2005   -  DOS:07/15/2025   -  INTERPRETING PROVIDER:Evi Bradford,   Indication  ========    Obesity in pregnancy BMI 37.4    Method  ======    Transabdominal ultrasound examination. View: suboptimal due to unfavorable fetal position and maternal acoustic properties    Dating  ======    LMP on: 2025  GA by LMP 20 w + 4 d  VALERIE by LMP: 2025  Prior assessment by: From outside records  GA by prior assessment 20 w + 3 d  VALERIE by prior assessment: 2025  Ultrasound examination on: 7/15/2025  GA by U/S based upon: AC, BPD, Femur, HC  GA by U/S 21 w + 2 d  VALERIE by U/S: 2025  Assigned: based on stated VALERIE (From outside records), selected on 07/15/2025  Assigned GA 20 w + 3 d  Assigned VALERIE: 2025    Fetal Growth Overview  =================    Exam date        GA              BPD (mm)          HC (mm)              AC (mm)            FL (mm)             HL (mm)             EFW (g)  07/15/2025        20w 3d        50.9     85%        188.3    73%        164     77%        35.1    65%        32.5     69%        412    86%    Fetal Biometry  ============    Standard  BPD 50.9 mm 21w 3d 85% Hadlock  OFD 66.9 mm 22w 3d 97% Cristian  .3 mm 21w 1d 73% Hadlock  Cerebellum tr 22.5 mm 21w 0d 86% Hill  Nuchal fold 4.3 mm  .0 mm 21w 3d 77% Hadlock  Femur 35.1 mm 21w 1d 65% Hadlock  Humerus 32.5 mm 21w 0d 69% Cristian   g 21w 1d 86% Hadlock  EFW (lb) 0 lb  EFW (oz) 15 oz  EFW by: Hadlock (BPD-HC-AC-FL)  Extended   5.2 mm  CM 4.8 mm  41% Nicolaides  Nasal bone 6.3 mm  Head / Face / Neck  Nasal bone: present  Other Structures   bpm    General Evaluation  ==============    Cardiac activity present.  bpm. Fetal movements: visualized. Presentation: Cephalic  Placenta: Placental site: anterior, appropriate distance from the internal os. Placental edge-to-cervical os distance 5.7 cm  Umbilical cord: Cord vessels: 3 vessel cord. Insertion site:

## 2025-07-17 ENCOUNTER — ROUTINE PRENATAL (OUTPATIENT)
Age: 20
End: 2025-07-17

## 2025-07-17 VITALS
HEART RATE: 105 BPM | RESPIRATION RATE: 20 BRPM | DIASTOLIC BLOOD PRESSURE: 73 MMHG | BODY MASS INDEX: 41.15 KG/M2 | HEIGHT: 65 IN | SYSTOLIC BLOOD PRESSURE: 110 MMHG | TEMPERATURE: 98.3 F | OXYGEN SATURATION: 98 % | WEIGHT: 247 LBS

## 2025-07-17 DIAGNOSIS — O99.210 OBESITY IN PREGNANCY: ICD-10-CM

## 2025-07-17 DIAGNOSIS — Z3A.20 20 WEEKS GESTATION OF PREGNANCY: Primary | ICD-10-CM

## 2025-07-17 DIAGNOSIS — Z3A.20 20 WEEKS GESTATION OF PREGNANCY: ICD-10-CM

## 2025-07-17 PROCEDURE — 0502F SUBSEQUENT PRENATAL CARE: CPT | Performed by: OBSTETRICS & GYNECOLOGY

## 2025-07-17 NOTE — PROGRESS NOTES
Prenatal Visit    Chief Complaint:   Chief Complaint   Patient presents with    Routine Prenatal Visit       HPI:    Kassie Arias is a 20 y.o.  Black /   female  at 20w5d weeks. Patient reports Positive fetal movements. Patient reports no problems with contractions, vaginal bleeding or loss of fluid.     Patient able to void today: No    Pt saw MFM for anatomy US and it was incomplete. Follow up scheduled. Dr Bradford requested CMP and A1c.     OB History    Para Term  AB Living   1 0 0 0 0 0   SAB IAB Ectopic Molar Multiple Live Births   0 0 0 0 0 0      # Outcome Date GA Lbr Jagdeep/2nd Weight Sex Type Anes PTL Lv   1 Current                Mother's Prenatal Vitals  BP: 110/73  Weight - Scale: 112 kg (247 lb)  Pulse: (!) 105  Prenatal Fetal Information  Fetal HR: 150  Movement: Present       Patient Active Problem List   Diagnosis    Pregnancy    Obesity in pregnancy        Assessment/Plan:  Kassie Arias is a 20 y.o.  Black /  female at 20w5d weeks.   Pregnancy complicated by obesity.   Currently doing well.    Pregnancy:   Discussed expectations at current gestation. Tylenol/heat/ice rest for discomforts noted in pregnancy  Discussed monitoring fetal movements and signs of labor.   Labs ordered today: CMP, A1c  Ultrasound ordered: yes - with MFM  The patient will return to the office for her next visit in 4 week(s). With glucola   Obesity  Continue ASA  MFM US and monitoring  Labs per Dr Sanchez Alvarez MD

## 2025-07-17 NOTE — PROGRESS NOTES
Chief Complaint   Patient presents with    Routine Prenatal Visit       Doing well since last visit    Active FM  Denies lof, vb & contractions    1. Have you been to the ER, urgent care clinic since your last visit?  Hospitalized since your last visit? no    2. Have you seen or consulted any other health care providers outside of the [facility] since your last visit?  Include any pap smears or colon screening. no

## 2025-07-18 LAB
ALBUMIN SERPL-MCNC: 2.9 G/DL (ref 3.5–5)
ALBUMIN/GLOB SERPL: 1 (ref 1.1–2.2)
ALP SERPL-CCNC: 72 U/L (ref 45–117)
ALT SERPL-CCNC: 36 U/L (ref 12–78)
ANION GAP SERPL CALC-SCNC: 6 MMOL/L (ref 2–12)
AST SERPL-CCNC: 15 U/L (ref 15–37)
BILIRUB SERPL-MCNC: 0.3 MG/DL (ref 0.2–1)
BUN SERPL-MCNC: 7 MG/DL (ref 6–20)
BUN/CREAT SERPL: 11 (ref 12–20)
CALCIUM SERPL-MCNC: 9.2 MG/DL (ref 8.5–10.1)
CHLORIDE SERPL-SCNC: 103 MMOL/L (ref 97–108)
CO2 SERPL-SCNC: 28 MMOL/L (ref 21–32)
CREAT SERPL-MCNC: 0.66 MG/DL (ref 0.55–1.02)
EST. AVERAGE GLUCOSE BLD GHB EST-MCNC: 114 MG/DL
GLOBULIN SER CALC-MCNC: 2.9 G/DL (ref 2–4)
GLUCOSE SERPL-MCNC: 118 MG/DL (ref 65–100)
HBA1C MFR BLD: 5.6 % (ref 4–5.6)
POTASSIUM SERPL-SCNC: 3.7 MMOL/L (ref 3.5–5.1)
PROT SERPL-MCNC: 5.8 G/DL (ref 6.4–8.2)
SODIUM SERPL-SCNC: 137 MMOL/L (ref 136–145)

## 2025-08-05 ENCOUNTER — HOSPITAL ENCOUNTER (OUTPATIENT)
Facility: HOSPITAL | Age: 20
Discharge: HOME | End: 2025-08-05
Attending: EMERGENCY MEDICINE | Admitting: OBSTETRICS & GYNECOLOGY
Payer: COMMERCIAL

## 2025-08-05 VITALS
SYSTOLIC BLOOD PRESSURE: 122 MMHG | DIASTOLIC BLOOD PRESSURE: 73 MMHG | TEMPERATURE: 98.4 F | RESPIRATION RATE: 18 BRPM | BODY MASS INDEX: 39.94 KG/M2 | OXYGEN SATURATION: 100 % | WEIGHT: 240 LBS | HEART RATE: 101 BPM

## 2025-08-05 PROBLEM — M54.50 ACUTE LEFT-SIDED LOW BACK PAIN WITHOUT SCIATICA: Status: ACTIVE | Noted: 2025-08-05

## 2025-08-05 LAB
AMPHET UR QL SCN: NEGATIVE
APPEARANCE UR: CLEAR
BACTERIA URNS QL MICRO: ABNORMAL /HPF
BARBITURATES UR QL SCN: NEGATIVE
BENZODIAZ UR QL: NEGATIVE
BILIRUB UR QL: NEGATIVE
CANNABINOIDS UR QL SCN: NEGATIVE
COCAINE UR QL SCN: NEGATIVE
COLOR UR: ABNORMAL
EPITH CASTS URNS QL MICRO: ABNORMAL /LPF
FENTANYL: NEGATIVE
GLUCOSE UR STRIP.AUTO-MCNC: NEGATIVE MG/DL
HGB UR QL STRIP: NEGATIVE
HYALINE CASTS URNS QL MICRO: ABNORMAL /LPF (ref 0–2)
KETONES UR QL STRIP.AUTO: NEGATIVE MG/DL
LEUKOCYTE ESTERASE UR QL STRIP.AUTO: ABNORMAL
Lab: NORMAL
METHADONE UR QL: NEGATIVE
NITRITE UR QL STRIP.AUTO: NEGATIVE
OPIATES UR QL: NEGATIVE
PCP UR QL: NEGATIVE
PH UR STRIP: 6.5 (ref 5–8)
PROT UR STRIP-MCNC: NEGATIVE MG/DL
RBC #/AREA URNS HPF: ABNORMAL /HPF (ref 0–5)
SP GR UR REFRACTOMETRY: 1.01
URINE CULTURE IF INDICATED: ABNORMAL
UROBILINOGEN UR QL STRIP.AUTO: 0.2 EU/DL (ref 0.2–1)
WBC URNS QL MICRO: ABNORMAL /HPF (ref 0–4)

## 2025-08-05 PROCEDURE — 4500000002 HC ER NO CHARGE

## 2025-08-05 PROCEDURE — 99212 OFFICE O/P EST SF 10 MIN: CPT

## 2025-08-05 PROCEDURE — 6370000000 HC RX 637 (ALT 250 FOR IP): Performed by: OBSTETRICS & GYNECOLOGY

## 2025-08-05 PROCEDURE — 81001 URINALYSIS AUTO W/SCOPE: CPT

## 2025-08-05 RX ORDER — CYCLOBENZAPRINE HCL 10 MG
10 TABLET ORAL ONCE
Status: DISCONTINUED | OUTPATIENT
Start: 2025-08-05 | End: 2025-08-05

## 2025-08-05 RX ORDER — CYCLOBENZAPRINE HCL 10 MG
5 TABLET ORAL ONCE
Status: COMPLETED | OUTPATIENT
Start: 2025-08-05 | End: 2025-08-05

## 2025-08-05 RX ADMIN — CYCLOBENZAPRINE HYDROCHLORIDE 5 MG: 10 TABLET, FILM COATED ORAL at 06:05

## 2025-08-05 ASSESSMENT — PAIN SCALES - GENERAL: PAINLEVEL_OUTOF10: 8

## 2025-08-06 ENCOUNTER — ROUTINE PRENATAL (OUTPATIENT)
Age: 20
End: 2025-08-06

## 2025-08-06 VITALS
BODY MASS INDEX: 40.65 KG/M2 | HEIGHT: 65 IN | SYSTOLIC BLOOD PRESSURE: 106 MMHG | RESPIRATION RATE: 16 BRPM | WEIGHT: 244 LBS | HEART RATE: 100 BPM | TEMPERATURE: 98.7 F | OXYGEN SATURATION: 98 % | DIASTOLIC BLOOD PRESSURE: 71 MMHG

## 2025-08-06 DIAGNOSIS — O99.891 BACK PAIN AFFECTING PREGNANCY IN SECOND TRIMESTER: ICD-10-CM

## 2025-08-06 DIAGNOSIS — Z3A.23 23 WEEKS GESTATION OF PREGNANCY: Primary | ICD-10-CM

## 2025-08-06 DIAGNOSIS — M54.9 BACK PAIN AFFECTING PREGNANCY IN SECOND TRIMESTER: ICD-10-CM

## 2025-08-06 PROCEDURE — 0502F SUBSEQUENT PRENATAL CARE: CPT | Performed by: OBSTETRICS & GYNECOLOGY

## 2025-08-06 RX ORDER — CYCLOBENZAPRINE HCL 5 MG
5 TABLET ORAL 3 TIMES DAILY PRN
COMMUNITY

## 2025-08-12 DIAGNOSIS — Z3A.23 23 WEEKS GESTATION OF PREGNANCY: ICD-10-CM

## 2025-08-13 ENCOUNTER — ROUTINE PRENATAL (OUTPATIENT)
Age: 20
End: 2025-08-13
Payer: COMMERCIAL

## 2025-08-13 VITALS — HEART RATE: 106 BPM | DIASTOLIC BLOOD PRESSURE: 83 MMHG | SYSTOLIC BLOOD PRESSURE: 121 MMHG

## 2025-08-13 DIAGNOSIS — Z3A.24 24 WEEKS GESTATION OF PREGNANCY: ICD-10-CM

## 2025-08-13 DIAGNOSIS — O99.213 OBESITY AFFECTING PREGNANCY IN THIRD TRIMESTER, UNSPECIFIED OBESITY TYPE: Primary | ICD-10-CM

## 2025-08-13 LAB
BLOOD BANK CMNT PATIENT-IMP: NORMAL
BLOOD GROUP ANTIBODIES SERPL: NORMAL
ERYTHROCYTE [DISTWIDTH] IN BLOOD BY AUTOMATED COUNT: 13.6 % (ref 11.5–14.5)
FERRITIN SERPL-MCNC: 12 NG/ML (ref 13–252)
GLUCOSE 1H P 100 G GLC PO SERPL-MCNC: 126 MG/DL (ref 65–140)
HCT VFR BLD AUTO: 35.8 % (ref 35–47)
HGB BLD-MCNC: 11.2 G/DL (ref 11.5–16)
MCH RBC QN AUTO: 28.3 PG (ref 26–34)
MCHC RBC AUTO-ENTMCNC: 31.3 G/DL (ref 30–36.5)
MCV RBC AUTO: 90.4 FL (ref 80–99)
NRBC # BLD: 0 K/UL (ref 0–0.01)
NRBC BLD-RTO: 0 PER 100 WBC
PLATELET # BLD AUTO: 214 K/UL (ref 150–400)
PMV BLD AUTO: 12.1 FL (ref 8.9–12.9)
RBC # BLD AUTO: 3.96 M/UL (ref 3.8–5.2)
T PALLIDUM AB SER QL IA: NON REACTIVE
WBC # BLD AUTO: 8.8 K/UL (ref 3.6–11)

## 2025-08-13 PROCEDURE — 99024 POSTOP FOLLOW-UP VISIT: CPT | Performed by: OBSTETRICS & GYNECOLOGY

## 2025-08-13 PROCEDURE — 76816 OB US FOLLOW-UP PER FETUS: CPT | Performed by: OBSTETRICS & GYNECOLOGY

## 2025-08-26 ENCOUNTER — HOSPITAL ENCOUNTER (OUTPATIENT)
Facility: HOSPITAL | Age: 20
Discharge: HOME | End: 2025-08-26
Attending: OBSTETRICS & GYNECOLOGY | Admitting: OBSTETRICS & GYNECOLOGY
Payer: COMMERCIAL

## 2025-08-26 VITALS
WEIGHT: 244.71 LBS | TEMPERATURE: 98.2 F | DIASTOLIC BLOOD PRESSURE: 68 MMHG | SYSTOLIC BLOOD PRESSURE: 120 MMHG | BODY MASS INDEX: 40.72 KG/M2 | HEART RATE: 110 BPM | RESPIRATION RATE: 18 BRPM | OXYGEN SATURATION: 100 %

## 2025-08-26 PROCEDURE — 87591 N.GONORRHOEAE DNA AMP PROB: CPT

## 2025-08-26 PROCEDURE — 87491 CHLMYD TRACH DNA AMP PROBE: CPT

## 2025-08-26 PROCEDURE — 4500000002 HC ER NO CHARGE

## 2025-08-26 ASSESSMENT — PAIN SCALES - GENERAL: PAINLEVEL_OUTOF10: 0

## 2025-08-27 LAB
C TRACH DNA SPEC QL NAA+PROBE: NEGATIVE
N GONORRHOEA DNA SPEC QL NAA+PROBE: NEGATIVE
SAMPLE TYPE: NORMAL
SERVICE CMNT-IMP: NORMAL
SPECIMEN SOURCE: NORMAL

## 2025-08-29 LAB — T PALLIDUM AB SER QL IA: NON REACTIVE
